# Patient Record
Sex: FEMALE | Race: WHITE | NOT HISPANIC OR LATINO | Employment: UNEMPLOYED | URBAN - METROPOLITAN AREA
[De-identification: names, ages, dates, MRNs, and addresses within clinical notes are randomized per-mention and may not be internally consistent; named-entity substitution may affect disease eponyms.]

---

## 2018-07-24 ENCOUNTER — OFFICE VISIT (OUTPATIENT)
Dept: MEDICAL GROUP | Facility: MEDICAL CENTER | Age: 63
End: 2018-07-24
Attending: FAMILY MEDICINE
Payer: MEDICAID

## 2018-07-24 VITALS
WEIGHT: 138.8 LBS | HEIGHT: 67 IN | DIASTOLIC BLOOD PRESSURE: 62 MMHG | HEART RATE: 88 BPM | OXYGEN SATURATION: 96 % | SYSTOLIC BLOOD PRESSURE: 112 MMHG | RESPIRATION RATE: 16 BRPM | TEMPERATURE: 98 F | BODY MASS INDEX: 21.79 KG/M2

## 2018-07-24 DIAGNOSIS — M50.30 DEGENERATIVE DISC DISEASE, CERVICAL: ICD-10-CM

## 2018-07-24 DIAGNOSIS — F41.9 ANXIETY: ICD-10-CM

## 2018-07-24 DIAGNOSIS — Z98.84 STATUS POST GASTRIC BYPASS FOR OBESITY: ICD-10-CM

## 2018-07-24 DIAGNOSIS — E03.9 ACQUIRED HYPOTHYROIDISM: ICD-10-CM

## 2018-07-24 PROCEDURE — 99203 OFFICE O/P NEW LOW 30 MIN: CPT | Performed by: FAMILY MEDICINE

## 2018-07-24 PROCEDURE — 99204 OFFICE O/P NEW MOD 45 MIN: CPT | Performed by: FAMILY MEDICINE

## 2018-07-24 RX ORDER — THYROID 120 MG/1
120 TABLET ORAL DAILY
Qty: 30 TAB | Refills: 3 | Status: SHIPPED | OUTPATIENT
Start: 2018-07-24 | End: 2019-05-23

## 2018-07-24 RX ORDER — DIAZEPAM 5 MG/1
5 TABLET ORAL EVERY 6 HOURS PRN
Qty: 30 TAB | Refills: 1 | Status: SHIPPED | OUTPATIENT
Start: 2018-07-24 | End: 2018-08-23

## 2018-07-24 RX ORDER — OXYCODONE AND ACETAMINOPHEN 10; 325 MG/1; MG/1
1-2 TABLET ORAL EVERY 4 HOURS PRN
COMMUNITY
End: 2019-05-23

## 2018-07-24 RX ORDER — DIAZEPAM 5 MG/1
5 TABLET ORAL EVERY 6 HOURS PRN
COMMUNITY
End: 2018-08-24 | Stop reason: SDUPTHER

## 2018-07-24 ASSESSMENT — PAIN SCALES - GENERAL: PAINLEVEL: NO PAIN

## 2018-07-24 ASSESSMENT — PATIENT HEALTH QUESTIONNAIRE - PHQ9: CLINICAL INTERPRETATION OF PHQ2 SCORE: 0

## 2018-07-24 NOTE — ASSESSMENT & PLAN NOTE
Patient presents to establish care. She was cared for by Dr. Silveira. She is also followed by Dr. Parra for continued right-sided cervical radiculopathy. She reports pain will extend consistently down to right elbow. She had been taking Percocet 10/325 3-4 times daily has been out of that medicine for 6 weeks. There is consideration of doing a additional fusion on C3-4. Her last visit with Dr. Parra was on 4/19/18 at which time she was sent off her course of physical therapy which she reports was not very helpful. She cannot consistently take NSAIDs due to history of previous gastric bypass surgery. She also is taking diazepam phenomenally for anxiety but also for muscle relaxant, 5 mg 3 times a day.

## 2018-07-24 NOTE — ASSESSMENT & PLAN NOTE
Patient was noted to be hypothyroid in approximately 1990. She has not had any thyroid surgery. She has been taking [place] Thyroid 180 µg daily. Recent TSH obtained by her gynecologist in June was notable for suppressed TSH at 0.20 she denies any palpitations, diarrhea

## 2018-07-24 NOTE — ASSESSMENT & PLAN NOTE
Patient reports a long-standing history of anxiety and believe she's been taking Valium 5 mg 3 times a day for about 1-1/2 years. She denies any current suicidal ideation, or confusion. She is not taking any antidepressant medication.

## 2018-07-24 NOTE — PROGRESS NOTES
Chief Complaint   Patient presents with   • Establish Care         HISTORY OF THE PRESENT ILLNESS: Patient is a 63 y.o. female. This pleasant patient is here today to establish care and address the following problems.      Degenerative disc disease, cervical  Patient presents to establish care. She was cared for by Dr. Silveira. She is also followed by Dr. Parra for continued right-sided cervical radiculopathy. She reports pain will extend consistently down to right elbow. She had been taking Percocet 10/325 3-4 times daily has been out of that medicine for 6 weeks. There is consideration of doing a additional fusion on C3-4. Her last visit with Dr. Parra was on 4/19/18 at which time she was sent off her course of physical therapy which she reports was not very helpful. She cannot consistently take NSAIDs due to history of previous gastric bypass surgery. She also is taking diazepam phenomenally for anxiety but also for muscle relaxant, 5 mg 3 times a day.    Anxiety  Patient reports a long-standing history of anxiety and believe she's been taking Valium 5 mg 3 times a day for about 1-1/2 years. She denies any current suicidal ideation, or confusion. She is not taking any antidepressant medication.    Acquired hypothyroidism  Patient was noted to be hypothyroid in approximately 1990. She has not had any thyroid surgery. She has been taking [place] Thyroid 180 µg daily. Recent TSH obtained by her gynecologist in June was notable for suppressed TSH at 0.20 she denies any palpitations, diarrhea     Social history-single, not working  Allergies: Nkda [no known drug allergy]    Current Outpatient Prescriptions Ordered in [facility]   Medication Sig Dispense Refill   • oxyCODONE-acetaminophen (PERCOCET-10)  MG Tab Take 1-2 Tabs by mouth every four hours as needed for Severe Pain.     • thyroid (ARMOUR THYROID) 120 MG Tab Take 1 Tab by mouth every day. 30 Tab 3   • diazePAM (VALIUM) 5 MG Tab Take 1 Tab by mouth every 6 hours as  needed for Anxiety for up to 30 days. 30 Tab 1   • diazePAM (VALIUM) 5 MG Tab Take 5 mg by mouth every 6 hours as needed for Anxiety.     • amoxicillin (AMOXIL) 500 MG CAPS Take 500 mg by mouth 3 times a day.     • Hydrocod Polst-Chlorphen Polst (TUSSIONEX PENNKINETIC ER PO) Take  by mouth.     • THYROID 2 gr. daily       No current Epic-ordered facility-administered medications on file.        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   • Hypothyroid 1990       Past Surgical History:   Procedure Laterality Date   • LAMINOTOMY  2006    ant  C5-7 fusion   • OOPHORECTOMY Left 1999   • ABDOMINAL EXPLORATION      gastric bypass   • OTHER ABDOMINAL SURGERY      left ovary (cyst) removed, D & C  x3   • OTHER ORTHOPEDIC SURGERY      [facility]       Social History   Substance Use Topics   • Smoking status: Never Smoker   • Smokeless tobacco: Never Used   • Alcohol use No       Family Status   Relation Status   • Paternal Grandmother    • Neg Hx      Family History   Problem Relation Age of Onset   • Cancer Paternal Grandmother      breast   • Diabetes Neg Hx    • Heart Disease Neg Hx    • Stroke Neg Hx        Review of Systems   Constitutional: Negative for fever, chills, weight loss and malaise/fatigue.   HENT: Negative for ear pain, nosebleeds, congestion, sore throat and neck pain.    Eyes: Negative for blurred vision.   Respiratory: Negative for cough, sputum production, shortness of breath and wheezing.    Cardiovascular: Negative for chest pain, palpitations, orthopnea and leg swelling.   Gastrointestinal: Negative for heartburn, nausea, vomiting and abdominal pain.   Genitourinary: Negative for dysuria, urgency and frequency.   Musculoskeletal: Negative for myalgias, back pain and positive for right upper arm pain.   Skin: Negative for rash and itching.   Neurological: Negative for dizziness, tingling, tremors, sensory change, focal  "weakness and headaches.   Endo/Heme/Allergies: Does not bruise/bleed easily.   Psychiatric/Behavioral: Negative for depression, positive for anxiety          Exam: Blood pressure 112/62, pulse 88, temperature 36.7 °C (98 °F), resp. rate 16, height 1.702 m (5' 7\"), weight 63 kg (138 lb 12.8 oz), SpO2 96 %, not currently breastfeeding.  General: Normal appearing. No distress.  HEENT: Normocephalic. Eyes conjunctiva clear lids without ptosis, pupils equal and reactive to light accommodation, ears normal shape and contour, canals are clear bilaterally, tympanic membranes are benign, nasal mucosa benign, oropharynx is without erythema, edema or exudates.   Neck: Supple without JVD or bruit. Thyroid is not enlarged.  Pulmonary: Clear to ausculation.  Normal effort. No rales, ronchi, or wheezing.  Cardiovascular: Regular rate and rhythm without murmur. Carotid and radial pulses are intact and equal bilaterally.  Abdomen: Soft, nontender, nondistended. Normal bowel sounds. Liver and spleen are not palpable  Neurologic: Intact light touch and strength bilaterally,normal speech, no tremor, normal gait.   Lymph: No cervical, supraclavicular or axillary lymph nodes are palpable  Skin: Warm and dry.  No obvious lesions.  Musculoskeletal: Normal gait. No extremity cyanosis, clubbing, or edema.  Psych: Normal mood and affect. Alert and oriented x3. Judgment and insight is normal.    Please note that this dictation was created using voice recognition software. I have made every reasonable attempt to correct obvious errors, but I expect that there are errors of grammar and possibly content that I did not discover before finalizing the note.      Assessment/Plan  1. Acquired hypothyroidism  TSH   2. Anxiety  diazePAM (VALIUM) 5 MG Tab   3. Status post gastric bypass for obesity     4. Degenerative disc disease, cervical       Plan: 1. Reduce [place] Thyroid to 120 µg daily  2. Collect TSH (quest) in 4 weeks  3. Revisit in 4 weeks  4. " Due to risk of respiratory depression patient was allowed to choose to remain on diazepam and we will not renew Percocet

## 2018-08-23 ENCOUNTER — TELEPHONE (OUTPATIENT)
Dept: MEDICAL GROUP | Facility: MEDICAL CENTER | Age: 63
End: 2018-08-23

## 2018-08-24 ENCOUNTER — OFFICE VISIT (OUTPATIENT)
Dept: MEDICAL GROUP | Facility: MEDICAL CENTER | Age: 63
End: 2018-08-24
Attending: FAMILY MEDICINE
Payer: MEDICAID

## 2018-08-24 VITALS
TEMPERATURE: 98 F | BODY MASS INDEX: 22.6 KG/M2 | RESPIRATION RATE: 16 BRPM | HEIGHT: 67 IN | HEART RATE: 80 BPM | DIASTOLIC BLOOD PRESSURE: 70 MMHG | SYSTOLIC BLOOD PRESSURE: 118 MMHG | OXYGEN SATURATION: 98 % | WEIGHT: 144 LBS

## 2018-08-24 DIAGNOSIS — S39.012A STRAIN, SACRAL, INITIAL ENCOUNTER: ICD-10-CM

## 2018-08-24 DIAGNOSIS — M50.30 DEGENERATIVE DISC DISEASE, CERVICAL: ICD-10-CM

## 2018-08-24 PROCEDURE — 99213 OFFICE O/P EST LOW 20 MIN: CPT | Performed by: FAMILY MEDICINE

## 2018-08-24 RX ORDER — OXYCODONE AND ACETAMINOPHEN 10; 325 MG/1; MG/1
1 TABLET ORAL EVERY 6 HOURS PRN
Qty: 15 TAB | Refills: 0 | Status: SHIPPED | OUTPATIENT
Start: 2018-08-24 | End: 2018-09-21

## 2018-08-24 RX ORDER — DIAZEPAM 5 MG/1
5 TABLET ORAL
Qty: 30 TAB | Refills: 1 | Status: SHIPPED | OUTPATIENT
Start: 2018-08-24 | End: 2018-09-17 | Stop reason: SDUPTHER

## 2018-08-24 ASSESSMENT — PAIN SCALES - GENERAL: PAINLEVEL: 3=SLIGHT PAIN

## 2018-08-24 NOTE — TELEPHONE ENCOUNTER
----- Message from Star Pina M.D. sent at 8/23/2018  9:35 AM PDT -----  Current thyroid is normal at TSH of 2.28 suggesting current dose of thyroid hormone at 120 µg is a good fit.

## 2018-08-24 NOTE — TELEPHONE ENCOUNTER
Phone Number Called: [phone] (home)       Message: Current thyroid is normal at TSH of 2.28 suggesting current dose of thyroid hormone at 120 µg is a good fit.     Left Message for patient to call back: yes

## 2018-08-24 NOTE — PROGRESS NOTES
"Subjective:      Shilpa Dobbs is a 63 y.o. female who presents with Hypothyroidism            HPI 1. Degenerative cervical disc disease-patient reports that she has done exception well taking a single 5 mg dose of diazepam daily with regard to muscle spasm and anxiety. She actually feels better than when she used to take 3 doses per day. Denies any recent panic episodes.  2.  Sacral strain-patient notes left-sided low back soreness over the past 3 days.  Denies any dysuria, altered gait    ROS negative for dyspnea, confusion, numbness       Objective:     /70   Pulse 80   Temp 36.7 °C (98 °F)   Resp 16   Ht 1.702 m (5' 7.01\")   Wt 65.3 kg (144 lb)   SpO2 98%   BMI 22.55 kg/m²      Physical Exam  Gen.- alert, cooperative, in no acute distress  Neck- midline trachea, thyroid not enlarged or tender,supple, no cervical adenopathy  Chest-clear to auscultation and percussion with normal breath sounds. No retractions. Chest wall nontender  Cardiac- regular rhythm and rate. No murmur, thrill, or heave  Back-mildly tender over the left SI area to palpation without redness or swelling.  Remainder of the bony midline elements are nontender          Assessment/Plan:     1. Degenerative disc disease, cervical    - diazePAM (VALIUM) 5 MG Tab; Take 1 Tab by mouth 1 time daily as needed for Anxiety for up to 30 days.  Dispense: 30 Tab; Refill: 1  - oxyCODONE-acetaminophen (PERCOCET-10)  MG Tab; Take 1 Tab by mouth every 6 hours as needed for Severe Pain for up to 28 days.  Dispense: 15 Tab; Refill: 0  - CONSENT FOR OPIATE PRESCRIPTION    2. Strain, sacral, initial encounter    Plan: 1.  Renew diazepam 5 mg once daily  2.  Revisit with me within 2 months      "

## 2018-09-17 DIAGNOSIS — M50.30 DEGENERATIVE DISC DISEASE, CERVICAL: ICD-10-CM

## 2018-09-17 RX ORDER — DIAZEPAM 5 MG/1
5 TABLET ORAL
Qty: 30 TAB | Refills: 0 | Status: SHIPPED | OUTPATIENT
Start: 2018-09-17 | End: 2018-10-17

## 2019-05-23 ENCOUNTER — APPOINTMENT (OUTPATIENT)
Dept: RADIOLOGY | Facility: MEDICAL CENTER | Age: 64
End: 2019-05-23
Attending: EMERGENCY MEDICINE
Payer: MEDICAID

## 2019-05-23 ENCOUNTER — HOSPITAL ENCOUNTER (OUTPATIENT)
Facility: MEDICAL CENTER | Age: 64
End: 2019-05-26
Attending: EMERGENCY MEDICINE | Admitting: HOSPITALIST
Payer: MEDICAID

## 2019-05-23 DIAGNOSIS — R20.2 PARESTHESIAS: ICD-10-CM

## 2019-05-23 LAB
ALBUMIN SERPL BCP-MCNC: 4.8 G/DL (ref 3.2–4.9)
ALBUMIN/GLOB SERPL: 2 G/DL
ALP SERPL-CCNC: 123 U/L (ref 30–99)
ALT SERPL-CCNC: 23 U/L (ref 2–50)
ANION GAP SERPL CALC-SCNC: 11 MMOL/L (ref 0–11.9)
APPEARANCE UR: CLEAR
AST SERPL-CCNC: 26 U/L (ref 12–45)
BASOPHILS # BLD AUTO: 1.1 % (ref 0–1.8)
BASOPHILS # BLD: 0.08 K/UL (ref 0–0.12)
BILIRUB SERPL-MCNC: 0.2 MG/DL (ref 0.1–1.5)
BILIRUB UR QL STRIP.AUTO: NEGATIVE
BUN SERPL-MCNC: 22 MG/DL (ref 8–22)
CALCIUM SERPL-MCNC: 9.1 MG/DL (ref 8.5–10.5)
CHLORIDE SERPL-SCNC: 106 MMOL/L (ref 96–112)
CO2 SERPL-SCNC: 22 MMOL/L (ref 20–33)
COLOR UR: YELLOW
CREAT SERPL-MCNC: 0.74 MG/DL (ref 0.5–1.4)
CRP SERPL HS-MCNC: 0.02 MG/DL (ref 0–0.75)
EOSINOPHIL # BLD AUTO: 0.21 K/UL (ref 0–0.51)
EOSINOPHIL NFR BLD: 2.9 % (ref 0–6.9)
ERYTHROCYTE [DISTWIDTH] IN BLOOD BY AUTOMATED COUNT: 49.8 FL (ref 35.9–50)
ERYTHROCYTE [SEDIMENTATION RATE] IN BLOOD BY WESTERGREN METHOD: 6 MM/HOUR (ref 0–30)
GLOBULIN SER CALC-MCNC: 2.4 G/DL (ref 1.9–3.5)
GLUCOSE SERPL-MCNC: 109 MG/DL (ref 65–99)
GLUCOSE UR STRIP.AUTO-MCNC: NEGATIVE MG/DL
HCT VFR BLD AUTO: 46 % (ref 37–47)
HGB BLD-MCNC: 14.6 G/DL (ref 12–16)
IMM GRANULOCYTES # BLD AUTO: 0.02 K/UL (ref 0–0.11)
IMM GRANULOCYTES NFR BLD AUTO: 0.3 % (ref 0–0.9)
KETONES UR STRIP.AUTO-MCNC: ABNORMAL MG/DL
LEUKOCYTE ESTERASE UR QL STRIP.AUTO: NEGATIVE
LYMPHOCYTES # BLD AUTO: 2.47 K/UL (ref 1–4.8)
LYMPHOCYTES NFR BLD: 34.3 % (ref 22–41)
MCH RBC QN AUTO: 28.8 PG (ref 27–33)
MCHC RBC AUTO-ENTMCNC: 31.7 G/DL (ref 33.6–35)
MCV RBC AUTO: 90.7 FL (ref 81.4–97.8)
MICRO URNS: ABNORMAL
MONOCYTES # BLD AUTO: 0.57 K/UL (ref 0–0.85)
MONOCYTES NFR BLD AUTO: 7.9 % (ref 0–13.4)
NEUTROPHILS # BLD AUTO: 3.86 K/UL (ref 2–7.15)
NEUTROPHILS NFR BLD: 53.5 % (ref 44–72)
NITRITE UR QL STRIP.AUTO: NEGATIVE
NRBC # BLD AUTO: 0 K/UL
NRBC BLD-RTO: 0 /100 WBC
PH UR STRIP.AUTO: 5 [PH]
PLATELET # BLD AUTO: 437 K/UL (ref 164–446)
PMV BLD AUTO: 10 FL (ref 9–12.9)
POTASSIUM SERPL-SCNC: 4 MMOL/L (ref 3.6–5.5)
PROT SERPL-MCNC: 7.2 G/DL (ref 6–8.2)
PROT UR QL STRIP: NEGATIVE MG/DL
RBC # BLD AUTO: 5.07 M/UL (ref 4.2–5.4)
RBC UR QL AUTO: NEGATIVE
SODIUM SERPL-SCNC: 139 MMOL/L (ref 135–145)
SP GR UR STRIP.AUTO: 1.03
TROPONIN I SERPL-MCNC: <0.01 NG/ML (ref 0–0.04)
UROBILINOGEN UR STRIP.AUTO-MCNC: 0.2 MG/DL
WBC # BLD AUTO: 7.2 K/UL (ref 4.8–10.8)

## 2019-05-23 PROCEDURE — 81003 URINALYSIS AUTO W/O SCOPE: CPT

## 2019-05-23 PROCEDURE — 80053 COMPREHEN METABOLIC PANEL: CPT

## 2019-05-23 PROCEDURE — 99285 EMERGENCY DEPT VISIT HI MDM: CPT

## 2019-05-23 PROCEDURE — 85652 RBC SED RATE AUTOMATED: CPT

## 2019-05-23 PROCEDURE — 85025 COMPLETE CBC W/AUTO DIFF WBC: CPT

## 2019-05-23 PROCEDURE — 86140 C-REACTIVE PROTEIN: CPT

## 2019-05-23 PROCEDURE — 71045 X-RAY EXAM CHEST 1 VIEW: CPT

## 2019-05-23 PROCEDURE — 84443 ASSAY THYROID STIM HORMONE: CPT

## 2019-05-23 PROCEDURE — 93005 ELECTROCARDIOGRAM TRACING: CPT | Performed by: EMERGENCY MEDICINE

## 2019-05-23 PROCEDURE — 84484 ASSAY OF TROPONIN QUANT: CPT

## 2019-05-23 RX ORDER — THYROID 120 MG/1
120 TABLET ORAL DAILY
COMMUNITY

## 2019-05-24 ENCOUNTER — APPOINTMENT (OUTPATIENT)
Dept: RADIOLOGY | Facility: MEDICAL CENTER | Age: 64
End: 2019-05-24
Attending: NURSE PRACTITIONER
Payer: MEDICAID

## 2019-05-24 ENCOUNTER — APPOINTMENT (OUTPATIENT)
Dept: RADIOLOGY | Facility: MEDICAL CENTER | Age: 64
End: 2019-05-24
Attending: EMERGENCY MEDICINE
Payer: MEDICAID

## 2019-05-24 PROBLEM — R20.2 NUMBNESS AND TINGLING OF BOTH FEET: Status: ACTIVE | Noted: 2019-05-24

## 2019-05-24 PROBLEM — R20.0 NUMBNESS AND TINGLING OF BOTH FEET: Status: ACTIVE | Noted: 2019-05-24

## 2019-05-24 LAB — TSH SERPL DL<=0.005 MIU/L-ACNC: 0.75 UIU/ML (ref 0.38–5.33)

## 2019-05-24 PROCEDURE — 700102 HCHG RX REV CODE 250 W/ 637 OVERRIDE(OP): Performed by: HOSPITALIST

## 2019-05-24 PROCEDURE — 99220 PR INITIAL OBSERVATION CARE,LEVL III: CPT | Performed by: HOSPITALIST

## 2019-05-24 PROCEDURE — 700117 HCHG RX CONTRAST REV CODE 255: Performed by: EMERGENCY MEDICINE

## 2019-05-24 PROCEDURE — 72158 MRI LUMBAR SPINE W/O & W/DYE: CPT

## 2019-05-24 PROCEDURE — A9270 NON-COVERED ITEM OR SERVICE: HCPCS | Performed by: HOSPITALIST

## 2019-05-24 PROCEDURE — 97161 PT EVAL LOW COMPLEX 20 MIN: CPT

## 2019-05-24 PROCEDURE — A9585 GADOBUTROL INJECTION: HCPCS | Performed by: EMERGENCY MEDICINE

## 2019-05-24 PROCEDURE — 700105 HCHG RX REV CODE 258: Performed by: HOSPITALIST

## 2019-05-24 PROCEDURE — G0378 HOSPITAL OBSERVATION PER HR: HCPCS

## 2019-05-24 RX ORDER — THYROID 120 MG/1
120 TABLET ORAL DAILY
Status: DISCONTINUED | OUTPATIENT
Start: 2019-05-24 | End: 2019-05-26 | Stop reason: HOSPADM

## 2019-05-24 RX ORDER — SODIUM CHLORIDE 9 MG/ML
1000 INJECTION, SOLUTION INTRAVENOUS ONCE
Status: COMPLETED | OUTPATIENT
Start: 2019-05-24 | End: 2019-05-24

## 2019-05-24 RX ORDER — GADOBUTROL 604.72 MG/ML
10 INJECTION INTRAVENOUS ONCE
Status: COMPLETED | OUTPATIENT
Start: 2019-05-24 | End: 2019-05-24

## 2019-05-24 RX ORDER — BISACODYL 10 MG
10 SUPPOSITORY, RECTAL RECTAL
Status: DISCONTINUED | OUTPATIENT
Start: 2019-05-24 | End: 2019-05-26 | Stop reason: HOSPADM

## 2019-05-24 RX ORDER — AMOXICILLIN 250 MG
2 CAPSULE ORAL 2 TIMES DAILY
Status: DISCONTINUED | OUTPATIENT
Start: 2019-05-24 | End: 2019-05-26 | Stop reason: HOSPADM

## 2019-05-24 RX ORDER — POLYETHYLENE GLYCOL 3350 17 G/17G
1 POWDER, FOR SOLUTION ORAL
Status: DISCONTINUED | OUTPATIENT
Start: 2019-05-24 | End: 2019-05-26 | Stop reason: HOSPADM

## 2019-05-24 RX ADMIN — THYROID, PORCINE 120 MG: 120 TABLET ORAL at 06:25

## 2019-05-24 RX ADMIN — GADOBUTROL 10 ML: 604.72 INJECTION INTRAVENOUS at 03:12

## 2019-05-24 RX ADMIN — SENNOSIDES, DOCUSATE SODIUM 2 TABLET: 50; 8.6 TABLET, FILM COATED ORAL at 06:24

## 2019-05-24 RX ADMIN — SODIUM CHLORIDE 1000 ML: 9 INJECTION, SOLUTION INTRAVENOUS at 04:38

## 2019-05-24 RX ADMIN — SENNOSIDES, DOCUSATE SODIUM 2 TABLET: 50; 8.6 TABLET, FILM COATED ORAL at 17:53

## 2019-05-24 ASSESSMENT — ENCOUNTER SYMPTOMS
DIZZINESS: 0
DEPRESSION: 0
MYALGIAS: 0
WEAKNESS: 1
DIARRHEA: 0
FEVER: 0
SHORTNESS OF BREATH: 0
ABDOMINAL PAIN: 0
CHILLS: 0
HEADACHES: 0
SORE THROAT: 0
COUGH: 0
WHEEZING: 0
PALPITATIONS: 0
NAUSEA: 0
FOCAL WEAKNESS: 0
SENSORY CHANGE: 1
VOMITING: 0
TINGLING: 1
PHOTOPHOBIA: 0

## 2019-05-24 ASSESSMENT — GAIT ASSESSMENTS
GAIT LEVEL OF ASSIST: INDEPENDENT
DISTANCE (FEET): 200

## 2019-05-24 ASSESSMENT — PATIENT HEALTH QUESTIONNAIRE - PHQ9
1. LITTLE INTEREST OR PLEASURE IN DOING THINGS: NOT AT ALL
SUM OF ALL RESPONSES TO PHQ9 QUESTIONS 1 AND 2: 0
2. FEELING DOWN, DEPRESSED, IRRITABLE, OR HOPELESS: NOT AT ALL

## 2019-05-24 ASSESSMENT — COGNITIVE AND FUNCTIONAL STATUS - GENERAL
SUGGESTED CMS G CODE MODIFIER MOBILITY: CH
MOBILITY SCORE: 24

## 2019-05-24 ASSESSMENT — LIFESTYLE VARIABLES: ALCOHOL_USE: NO

## 2019-05-24 NOTE — THERAPY
"Physical Therapy Evaluation completed.   Bed Mobility:  Supine to Sit: Independent  Transfers: Sit to Stand: Independent  Gait: Level Of Assist: Independent with No Equipment Needed       Plan of Care: Patient with no further skilled PT needs in the acute care setting at this time  Discharge Recommendations: Equipment: No Equipment Needed.   See \"Rehab Therapy-Acute\" Patient Summary Report for complete documentation.     Pt was able to demonstrate I functional mobility during ambulation, sit<>stand, and transfers. Pt demonstrated with no deficits during neurlogical screening, however, pt continued to report of numbness in balls of feet. Pt was able to complete dynamic balance testing with no marisol LOB. Pt current numbness in feet does not appear to be affecting her mobility at this time. Pt is in no acute skilled PT needs at this time. Anticpate pt to dc home once medically clear.   "

## 2019-05-24 NOTE — THERAPY
Occupational Therapy Contact Note    No OT needs, pt up self with self care and functional mobility. Reports feeling back to baseline more concerned about odd sensation changes in BLE.    Roopa Hernadez, OTR/L

## 2019-05-24 NOTE — ED NOTES
Med Rec Updated and Complete per Pt at bedside  Allergies Reviewed  No PO ABX last 30 days.    Pt denies OTC medication at this time.

## 2019-05-24 NOTE — PROGRESS NOTES
Patient brought up to floor by transport.  Admit profile complete, stroke packet handed to patient, KELLY scale 0, patient on RA. All questions answered at this time.

## 2019-05-24 NOTE — PROGRESS NOTES
MRI cervical and thoracic spine w/o on hold for till 5-25. Pt had contrast MRI lumbar spine w-w/o on 11-24 at 0300 am. 24 hour waiting period required for contrast to clear body.

## 2019-05-24 NOTE — ASSESSMENT & PLAN NOTE
This was acute in onset and patient had no injuries.    Patient has hx of previous cervical surgery   Mri shows moderate to severe central canal stenosis C4-5   Neurosurgery consulted

## 2019-05-24 NOTE — ED NOTES
Pt resting NAD. Breathing even and unlabored. Waiting for admission orders at this time. ERP aware

## 2019-05-24 NOTE — PROGRESS NOTES
Patient admitted after midnight by my colleague.  Patient was seen and evaluated on the floor.  Patient is a 63-year-old female who presented on 5/23/2019, with bilateral feet numbness and global weakness in her arms and legs.  Patient has known medical history of hypothyroidism anxiety choriocarcinoma with mets to the lungs and chemo 28 years ago, previous laminotomy with C5-7 fusion, gastric bypass, and left ovary removal.  Patient states that she had C-spine surgery approximately 10 years ago and was last seen by Dr. Singh a few years ago and told that her hardware was slipping and that she would likely need revision in the future.  Patient has not followed up on this.  On day of admission she presented with sudden onset numbness and tingling at the bottom of both feet and waves of weakness in her arms and legs.  Patient was admitted under observation to CDU for further work-up and monitoring.  MRI lumbar spine was completed which showed L1 superior endplate acute or recent subacute insufficiency compression fracture and L2 superior endplate Schmorl's node with minimal associated reactive marrow edema signal, which could be indicated as a pain generator.  CBC and CMP were essentially benign and noncontributory.  TSH was evaluated due to patient's hypothyroidism and possibility of contributing to neurological symptoms, however was within normal limits.  Urinalysis negative.  Patient's vital signs are stable at this time.  MRI C-spine and T-spine ordered and pending.  Unable to complete MRI at this time as she received contrast less than 24 hours ago.  Will watch patient overnight and obtain MRIs tomorrow to evaluate for possible need for neurosurgery consultation.

## 2019-05-24 NOTE — ED NOTES
Pt provided with toothbrush kit. All needs met at this time. NAD. Updated on delay for bed assignment. Pt verbalizes understanding

## 2019-05-24 NOTE — CARE PLAN
Problem: Communication  Goal: The ability to communicate needs accurately and effectively will improve  Outcome: PROGRESSING AS EXPECTED      Problem: Safety  Goal: Will remain free from injury  Outcome: PROGRESSING AS EXPECTED    Goal: Will remain free from falls  Outcome: PROGRESSING AS EXPECTED      Problem: Knowledge Deficit  Goal: Knowledge of disease process/condition, treatment plan, diagnostic tests, and medications will improve  Outcome: PROGRESSING AS EXPECTED    Goal: Knowledge of the prescribed therapeutic regimen will improve  Outcome: PROGRESSING AS EXPECTED

## 2019-05-24 NOTE — H&P
"Hospital Medicine History & Physical Note    Date of Service  5/24/2019    Primary Care Physician  Randa Hyman M.D.    Consultants  None    Code Status  Full    Chief Complaint  Chief Complaint   Patient presents with   • Weakness     bilateral legs; sudden onset at 1600   • Numbness     bilateral feet; sudden onset at 1600   • Dizziness     gradual onset t/o day, worsening at 1945       History of Presenting Illness  63 y.o. female who presented on 5/23/2019 with bilateral feet numbness and global weakness in her arms and legs.  The patient states that she has a history of cervical spine surgery that was completed 10 years ago with Dr. Juan.  Last year she was seen by Dr. Singh who had informed her that some of her screws were slipping and that she would likely need a revision of her cervical spine surgery.  She has not yet followed up for this.  Today, she had sudden onset of numbness and tingling on the bottom of both of her feet.  She also reports \"waves of weakness\" in her arms and legs.  She denies any incontinence, no saddle anesthesia, and no back pain.  She states that she was seen by her chiropractor who redirected her to the emergency room.  Otherwise prior to this, the patient was in her usual state of health which is good and she has no other somatic complaints.  No neurologic deficits are noted on bedside exam.    Review of Systems  Review of Systems   Constitutional: Negative for chills and fever.   HENT: Negative for congestion and sore throat.    Eyes: Negative for photophobia.   Respiratory: Negative for cough, shortness of breath and wheezing.    Cardiovascular: Negative for chest pain and palpitations.   Gastrointestinal: Negative for abdominal pain, diarrhea, nausea and vomiting.   Genitourinary: Negative for dysuria.   Musculoskeletal: Negative for myalgias.   Skin: Negative.    Neurological: Positive for tingling (feet), sensory change (bottom of both feet) and weakness (global). " Negative for dizziness, focal weakness and headaches.   Psychiatric/Behavioral: Negative for depression and suicidal ideas.       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       Surgical History  Past Surgical History:   Procedure Laterality Date   • LAMINOTOMY      ant  C5-7 fusion   • OOPHORECTOMY Left    • ABDOMINAL EXPLORATION      gastric bypass   • OTHER ABDOMINAL SURGERY      left ovary (cyst) removed, D & C  x3   • OTHER ORTHOPEDIC SURGERY      ACF       Family History  Family History   Problem Relation Age of Onset   • Cancer Paternal Grandmother         breast   • Diabetes Neg Hx    • Heart Disease Neg Hx    • Stroke Neg Hx        Social History  Social History   Substance Use Topics   • Smoking status: Never Smoker   • Smokeless tobacco: Never Used   • Alcohol use No       Allergies  Allergies   Allergen Reactions   • Nkda [No Known Drug Allergy]        Medications  No current facility-administered medications on file prior to encounter.      No current outpatient prescriptions on file prior to encounter.       Physical Exam  Hemodynamics  Temp (24hrs), Av.3 °C (97.3 °F), Min:36.3 °C (97.3 °F), Max:36.3 °C (97.3 °F)   Temperature: 36.3 °C (97.3 °F)  Pulse  Av.6  Min: 72  Max: 92 Heart Rate (Monitored): 76  Blood Pressure: 113/63, NIBP: (!) 97/51      Respiratory      Respiration: 16, Pulse Oximetry: 95 %             Physical Exam   Constitutional: She is oriented to person, place, and time. No distress.   HENT:   Head: Normocephalic and atraumatic.   Right Ear: External ear normal.   Left Ear: External ear normal.   Eyes: EOM are normal. Right eye exhibits no discharge. Left eye exhibits no discharge.   Neck: Neck supple. No JVD present.   Cardiovascular: Normal rate, regular rhythm and normal heart sounds.    Pulmonary/Chest: Effort normal and breath sounds  normal. No respiratory distress. She exhibits no tenderness.   Abdominal: Soft. Bowel sounds are normal. She exhibits no distension. There is no tenderness.   Musculoskeletal: Normal range of motion. She exhibits no edema.   Neurological: She is alert and oriented to person, place, and time. No cranial nerve deficit.   Skin: Skin is warm and dry. She is not diaphoretic. No erythema.   Psychiatric: She has a normal mood and affect. Her behavior is normal.   Nursing note and vitals reviewed.    Capillary refill less than 3 seconds, distal pulses intact    Laboratory:  Recent Labs      05/23/19 2035   WBC  7.2   RBC  5.07   HEMOGLOBIN  14.6   HEMATOCRIT  46.0   MCV  90.7   MCH  28.8   MCHC  31.7*   RDW  49.8   PLATELETCT  437   MPV  10.0     Recent Labs      05/23/19 2035   SODIUM  139   POTASSIUM  4.0   CHLORIDE  106   CO2  22   GLUCOSE  109*   BUN  22   CREATININE  0.74   CALCIUM  9.1     Recent Labs      05/23/19 2035   ALTSGPT  23   ASTSGOT  26   ALKPHOSPHAT  123*   TBILIRUBIN  0.2   GLUCOSE  109*                 Lab Results   Component Value Date    TROPONINI <0.01 05/23/2019       Imaging  Dx-chest-portable (1 View)    Result Date: 5/23/2019 5/23/2019 8:43 PM HISTORY/REASON FOR EXAM:  Chest Pain. TECHNIQUE/EXAM DESCRIPTION AND NUMBER OF VIEWS: Single portable view of the chest. COMPARISON: March 28, 2013 FINDINGS: Heart size is within normal limits. No pulmonary infiltrates or consolidations are noted. No pleural abnormalities are noted.     No acute cardiac or pulmonary abnormalities are identified.        Assessment/Plan:  Anticipate that patient will need less than 2 midnights for management of the discussed medical issues.    * Numbness and tingling of both feet   Assessment & Plan    This was acute in onset and patient had no injuries.  She otherwise has no other neurologic deficit.  She does carry a history of hypothyroidism and I will check a TSH with T4 as undercorrected thyroid disease may cause  neurologic issues.  Her inflammatory markers are negative and she has no risk factors for epidural abscess however an MRI has been ordered and is pending read.  In the meantime, the patient will be admitted to the hospital and will have her assessed by PT and OT for her weakness.  Pending the findings on the MRI, we may also consider a neurology consultation.  Patient is otherwise well-appearing with normal vital signs and laboratory findings.     Acquired hypothyroidism- (present on admission)   Assessment & Plan    This is chronic and stable, continue home [place] Thyroid and check TSH.         Prophylaxis: Sequential compression devices for DVT prophylaxis, no PPI indicated, bowel protocol as needed

## 2019-05-24 NOTE — ED TRIAGE NOTES
"Chief Complaint   Patient presents with   • Weakness     bilateral legs; sudden onset at 1600   • Numbness     bilateral feet; sudden onset at 1600   • Dizziness     gradual onset t/o day, worsening at 1945     Patient ambulatory to triage, A&O, anxious - patient ambulatory to triage after bilateral foot numbness, chiropractor recommended ED visit if persist a few hours; patient reports reports Dr. Singh indicated hardware in back is \"slipping and needs to be fixed\" - sx 2009 with dr. Clements (retired).    NIH scale 0, patient A&O, appropriate.  Charge RN made aware.    Patient to R4 via wheelchair.   "

## 2019-05-24 NOTE — ED NOTES
Called lab about CRP result; lab is looking for specimen now. Lab was received however not processed. Waiting for result now

## 2019-05-24 NOTE — PROGRESS NOTES
Pt awake,a&ox4,v/s wnl,no neuro deficits noticed at this time,pt ambulates steady,pt saying she has some tingling sensation on [place] feet,for MRI.

## 2019-05-24 NOTE — ED PROVIDER NOTES
"CHIEF COMPLAINT  Chief Complaint   Patient presents with   • Weakness     bilateral legs; sudden onset at 1600   • Numbness     bilateral feet; sudden onset at 1600   • Dizziness     gradual onset t/o day, worsening at 1945       HPI  Shilpa Dobbs is a 63 y.o. female here for evaluation of weakness, intermittent dizziness, and bilateral numbness to the bottoms of the feet.  Patient states that she has had weakness intermittently over the last few days, and numbness that started earlier this morning.  She denies any fever or chills, chest pain, or shortness of breath.  She denies falling, or trauma, She has no headache, and no back pain.  Patient has no factors that exacerbate or alleviate her symptoms, and she denies any history of the same.  She denies any trauma or recent injuries.  Patient states she does have \"plates and screws\" in her neck, and is very worried that these have moved.    PAST MEDICAL HISTORY   has a past medical history of Anxiety; Cancer (HCC); and Hypothyroid (1990).    SOCIAL HISTORY  Social History     Social History Main Topics   • Smoking status: Never Smoker   • Smokeless tobacco: Never Used   • Alcohol use No   • Drug use: No   • Sexual activity: Not Currently       Family History  No bleeding disorders    SURGICAL HISTORY   has a past surgical history that includes other orthopedic surgery; other abdominal surgery; laminotomy (2006); oophorectomy (Left, 1999); and abdominal exploration.    CURRENT MEDICATIONS  Home Medications     Reviewed by Bhavani Martinez R.N. (Registered Nurse) on 05/23/19 at 2014  Med List Status: Complete   Medication Last Dose Status   thyroid (ARMOUR THYROID) 120 MG Tab 5/23/2019 Active                ALLERGIES  Allergies   Allergen Reactions   • Nkda [No Known Drug Allergy]        REVIEW OF SYSTEMS  See HPI for further details. Review of systems as above, otherwise all other systems are negative.     PHYSICAL EXAM  Constitutional: Well developed, " well nourished.  Mild acute distress.  HEENT: Normocephalic, atraumatic. Posterior pharynx clear and moist.  Eyes:  EOMI. Normal sclera.  Neck: Supple, Full range of motion, nontender.  Chest/Pulmonary: clear to ausculation. Symmetrical expansion.   Cardio: Regular rate and rhythm with no murmur.   Abdomen: Soft, nontender. No peritoneal signs. No guarding. No palpable masses.  Musculoskeletal: No deformity, neurovascular intact.  Sensation changes to the plantar aspect of bilateral feet, not extending to the dorsum, no erythema, no edema.  Neuro: Clear speech, appropriate, cooperative, cranial nerves II-XII grossly intact.  Psych: Anxious, tearful      Results for orders placed or performed during the hospital encounter of 05/23/19   CBC WITH DIFFERENTIAL   Result Value Ref Range    WBC 7.2 4.8 - 10.8 K/uL    RBC 5.07 4.20 - 5.40 M/uL    Hemoglobin 14.6 12.0 - 16.0 g/dL    Hematocrit 46.0 37.0 - 47.0 %    MCV 90.7 81.4 - 97.8 fL    MCH 28.8 27.0 - 33.0 pg    MCHC 31.7 (L) 33.6 - 35.0 g/dL    RDW 49.8 35.9 - 50.0 fL    Platelet Count 437 164 - 446 K/uL    MPV 10.0 9.0 - 12.9 fL    Neutrophils-Polys 53.50 44.00 - 72.00 %    Lymphocytes 34.30 22.00 - 41.00 %    Monocytes 7.90 0.00 - 13.40 %    Eosinophils 2.90 0.00 - 6.90 %    Basophils 1.10 0.00 - 1.80 %    Immature Granulocytes 0.30 0.00 - 0.90 %    Nucleated RBC 0.00 /100 WBC    Neutrophils (Absolute) 3.86 2.00 - 7.15 K/uL    Lymphs (Absolute) 2.47 1.00 - 4.80 K/uL    Monos (Absolute) 0.57 0.00 - 0.85 K/uL    Eos (Absolute) 0.21 0.00 - 0.51 K/uL    Baso (Absolute) 0.08 0.00 - 0.12 K/uL    Immature Granulocytes (abs) 0.02 0.00 - 0.11 K/uL    NRBC (Absolute) 0.00 K/uL   COMP METABOLIC PANEL   Result Value Ref Range    Sodium 139 135 - 145 mmol/L    Potassium 4.0 3.6 - 5.5 mmol/L    Chloride 106 96 - 112 mmol/L    Co2 22 20 - 33 mmol/L    Anion Gap 11.0 0.0 - 11.9    Glucose 109 (H) 65 - 99 mg/dL    Bun 22 8 - 22 mg/dL    Creatinine 0.74 0.50 - 1.40 mg/dL    Calcium  9.1 8.5 - 10.5 mg/dL    AST(SGOT) 26 12 - 45 U/L    ALT(SGPT) 23 2 - 50 U/L    Alkaline Phosphatase 123 (H) 30 - 99 U/L    Total Bilirubin 0.2 0.1 - 1.5 mg/dL    Albumin 4.8 3.2 - 4.9 g/dL    Total Protein 7.2 6.0 - 8.2 g/dL    Globulin 2.4 1.9 - 3.5 g/dL    A-G Ratio 2.0 g/dL   TROPONIN   Result Value Ref Range    Troponin I <0.01 0.00 - 0.04 ng/mL   CRP QUANTITIVE (NON-CARDIAC)   Result Value Ref Range    Stat C-Reactive Protein 0.02 0.00 - 0.75 mg/dL   WESTERGREN SED RATE   Result Value Ref Range    Sed Rate Westergren 6 0 - 30 mm/hour   URINALYSIS,CULTURE IF INDICATED   Result Value Ref Range    Color Yellow     Character Clear     Specific Gravity 1.027 <1.035    Ph 5.0 5.0 - 8.0    Glucose Negative Negative mg/dL    Ketones Trace (A) Negative mg/dL    Protein Negative Negative mg/dL    Bilirubin Negative Negative    Urobilinogen, Urine 0.2 Negative    Nitrite Negative Negative    Leukocyte Esterase Negative Negative    Occult Blood Negative Negative    Micro Urine Req see below    ESTIMATED GFR   Result Value Ref Range    GFR If African American >60 >60 mL/min/1.73 m 2    GFR If Non African American >60 >60 mL/min/1.73 m 2   TSH WITH REFLEX TO FT4   Result Value Ref Range    TSH 0.750 0.380 - 5.330 uIU/mL   EKG   Result Value Ref Range    Report       [facility] Emergency Dept.    Test Date:  2019  Pt Name:    DAMON CRUZ                  Department: ER  MRN:        [id number]                      Room:       RD 04  Gender:     Female                       Technician: [id number]  :        [id number]                   Requested By:GAGAN HOLLIDAY  Order #:    [id number]                    Gabby MD:    Measurements  Intervals                                Axis  Rate:       86                           P:          -43  CT:         168                          QRS:        55  QRSD:       82                           T:          47  QT:         352  QTc:        421    Interpretive  Statements  SINUS RHYTHM  Compared to ECG 04/16/2008 17:01:06  No significant changes       DX-CHEST-PORTABLE (1 VIEW)   Final Result      No acute cardiac or pulmonary abnormalities are identified.      MR-LUMBAR SPINE-WITH & W/O    (Results Pending)       Ekg:  nsr 86.  No st elevation, no st depression, no ectopy, normal intervals.        PROCEDURES     MEDICAL RECORD  I have reviewed patient's medical record and pertinent results are listed above.    COURSE & MEDICAL DECISION MAKING  I have reviewed any medical record information, laboratory studies and radiographic results as noted above.    1:07 AM  The pt will be admitted to [facility] family medicine.  She continues to have foot paresthesias, but no weakness to the lower ext.      2:13 AM  The pt will be admitted to Dr. Trent.  We will order the MRI from here, of the lumbar spine. Dr. Trent will follow.   The pt has no incontinence to bowel/bladder. No urinary retention, no saddle anesthesia.     3:54 AM  I spoke to the radiologist, who states pt has a possible compression fracture of L1, possibly T12.  This could be old as well, in the absence of trauma or falling. No acute cord finding, no epidural abscess noted. Dr. Trent made aware.  Formal read in the am.     FINAL IMPRESSION  Foot paresthesias.       Electronically signed by: William Olmos, 5/23/2019 9:07 PM

## 2019-05-24 NOTE — DISCHARGE PLANNING
Care Transition Team Assessment    Spoke with patient at bedside. Anticipate no needs @ present time. Friend or family will be ride @ D/C.    Information Source  Orientation : Oriented x 4  Information Given By: Patient    Readmission Evaluation  Is this a readmission?: No    Interdisciplinary Discharge Planning  Does Admitting Nurse Feel This Could be a Complex Discharge?: No  Primary Care Physician: Randa Hyman   Lives with - Patient's Self Care Capacity: Alone and Able to Care For Self  Patient or legal guardian wants to designate a caregiver (see row info): No  Support Systems: Children  Housing / Facility: 1 Story Apartment / Condo  Do You Take your Prescribed Medications Regularly: Yes  Able to Return to Previous ADL's: Yes  Mobility Issues: No  Prior Services: Home-Independent  Patient Expects to be Discharged to:: Home  Assistance Needed: No  Durable Medical Equipment: Not Applicable    Discharge Preparedness  What are your discharge supports?: Child  Prior Functional Level: Ambulatory    Functional Assesment  Prior Functional Level: Ambulatory    Finances  Prescription Coverage: Yes    Anticipated Discharge Information  Anticipated discharge disposition: Home  Discharge Address: [address] [place] [address]  Discharge Contact Phone Number: [phone]

## 2019-05-25 ENCOUNTER — APPOINTMENT (OUTPATIENT)
Dept: RADIOLOGY | Facility: MEDICAL CENTER | Age: 64
End: 2019-05-25
Attending: NURSE PRACTITIONER
Payer: MEDICAID

## 2019-05-25 PROBLEM — M48.02 CERVICAL STENOSIS OF SPINAL CANAL: Status: ACTIVE | Noted: 2019-05-25

## 2019-05-25 LAB
ANION GAP SERPL CALC-SCNC: 7 MMOL/L (ref 0–11.9)
BUN SERPL-MCNC: 14 MG/DL (ref 8–22)
CALCIUM SERPL-MCNC: 8.8 MG/DL (ref 8.5–10.5)
CHLORIDE SERPL-SCNC: 107 MMOL/L (ref 96–112)
CO2 SERPL-SCNC: 26 MMOL/L (ref 20–33)
CREAT SERPL-MCNC: 0.65 MG/DL (ref 0.5–1.4)
ERYTHROCYTE [DISTWIDTH] IN BLOOD BY AUTOMATED COUNT: 48 FL (ref 35.9–50)
GLUCOSE SERPL-MCNC: 101 MG/DL (ref 65–99)
HCT VFR BLD AUTO: 38.3 % (ref 37–47)
HGB BLD-MCNC: 12.2 G/DL (ref 12–16)
MCH RBC QN AUTO: 28.4 PG (ref 27–33)
MCHC RBC AUTO-ENTMCNC: 31.9 G/DL (ref 33.6–35)
MCV RBC AUTO: 89.3 FL (ref 81.4–97.8)
PLATELET # BLD AUTO: 336 K/UL (ref 164–446)
PMV BLD AUTO: 9.8 FL (ref 9–12.9)
POTASSIUM SERPL-SCNC: 4 MMOL/L (ref 3.6–5.5)
RBC # BLD AUTO: 4.29 M/UL (ref 4.2–5.4)
SODIUM SERPL-SCNC: 140 MMOL/L (ref 135–145)
WBC # BLD AUTO: 4.5 K/UL (ref 4.8–10.8)

## 2019-05-25 PROCEDURE — G0378 HOSPITAL OBSERVATION PER HR: HCPCS

## 2019-05-25 PROCEDURE — 700102 HCHG RX REV CODE 250 W/ 637 OVERRIDE(OP): Performed by: HOSPITALIST

## 2019-05-25 PROCEDURE — 80048 BASIC METABOLIC PNL TOTAL CA: CPT

## 2019-05-25 PROCEDURE — 36415 COLL VENOUS BLD VENIPUNCTURE: CPT

## 2019-05-25 PROCEDURE — 72141 MRI NECK SPINE W/O DYE: CPT

## 2019-05-25 PROCEDURE — A9270 NON-COVERED ITEM OR SERVICE: HCPCS | Performed by: HOSPITALIST

## 2019-05-25 PROCEDURE — 85027 COMPLETE CBC AUTOMATED: CPT

## 2019-05-25 PROCEDURE — 72146 MRI CHEST SPINE W/O DYE: CPT

## 2019-05-25 PROCEDURE — 99225 PR SUBSEQUENT OBSERVATION CARE,LEVEL II: CPT | Performed by: HOSPITALIST

## 2019-05-25 RX ADMIN — THYROID, PORCINE 120 MG: 120 TABLET ORAL at 05:54

## 2019-05-25 RX ADMIN — SENNOSIDES, DOCUSATE SODIUM 2 TABLET: 50; 8.6 TABLET, FILM COATED ORAL at 19:04

## 2019-05-25 RX ADMIN — SENNOSIDES, DOCUSATE SODIUM 2 TABLET: 50; 8.6 TABLET, FILM COATED ORAL at 05:54

## 2019-05-25 ASSESSMENT — PATIENT HEALTH QUESTIONNAIRE - PHQ9
2. FEELING DOWN, DEPRESSED, IRRITABLE, OR HOPELESS: NOT AT ALL
1. LITTLE INTEREST OR PLEASURE IN DOING THINGS: NOT AT ALL
SUM OF ALL RESPONSES TO PHQ9 QUESTIONS 1 AND 2: 0

## 2019-05-25 ASSESSMENT — ENCOUNTER SYMPTOMS
DEPRESSION: 0
DIZZINESS: 0
HEADACHES: 0
VOMITING: 0
BLURRED VISION: 0
SHORTNESS OF BREATH: 0
COUGH: 0
WEAKNESS: 1
FALLS: 0
SORE THROAT: 0
FEVER: 0
ABDOMINAL PAIN: 0
MYALGIAS: 0
CHILLS: 0
NAUSEA: 0
MEMORY LOSS: 0

## 2019-05-25 NOTE — PROGRESS NOTES
Hospital Medicine Daily Progress Note    Date of Service  5/25/2019    Chief Complaint  63 y.o. female admitted 5/23/2019 with numbness, tingling, and extremity weakness     Hospital Course    Patient is a 63-year-old female who presented on 5/23/2019, with bilateral feet numbness and global weakness in her arms and legs.  Patient has known medical history of hypothyroidism anxiety choriocarcinoma with mets to the lungs and chemo 28 years ago, previous laminotomy with C5-7 fusion, gastric bypass, and left ovary removal.  Patient states that she had C-spine surgery approximately 10 years ago and was last seen by Dr. Singh a few years ago and told that her hardware was slipping and that she would likely need revision in the future.  Patient has not followed up on this.  On day of admission she presented with sudden onset numbness and tingling at the bottom of both feet and waves of weakness in her arms and legs.  Patient was admitted under observation to CDU for further work-up and monitoring.  MRI lumbar spine was completed which showed L1 superior endplate acute or recent subacute insufficiency compression fracture and L2 superior endplate Schmorl's node with minimal associated reactive marrow edema signal, which could be indicated as a pain generator.  CBC and CMP were essentially benign and noncontributory.  TSH was evaluated due to patient's hypothyroidism and possibility of contributing to neurological symptoms, however was within normal limits.  Urinalysis negative      Interval Problem Update  5/25- Patient continues to have intermittent numbness and tingling in bilateral legs, with extremity weakness. MRI Cervical completed which shows moderate to severe central canal stenosis at C4-5. Neurosurgery Dr. Barrios consulted to evaluate patient. Awaiting recommendations.      Consultants/Specialty  Neurosurgery- Dr. Barrios     Code Status  Full     Disposition  TBD based on recommendations per NSG     Review of  Systems  Review of Systems   Constitutional: Negative for chills and fever.   HENT: Negative for congestion and sore throat.    Eyes: Negative for blurred vision.   Respiratory: Negative for cough and shortness of breath.    Cardiovascular: Negative for chest pain and leg swelling.   Gastrointestinal: Negative for abdominal pain, nausea and vomiting.   Genitourinary: Negative for dysuria, frequency and urgency.   Musculoskeletal: Negative for falls and myalgias.   Skin: Negative for rash.   Neurological: Positive for weakness. Negative for dizziness and headaches.   Psychiatric/Behavioral: Negative for depression and memory loss.        Physical Exam  Temp:  [36.4 °C (97.6 °F)-37.1 °C (98.7 °F)] 36.8 °C (98.2 °F)  Pulse:  [69-85] 75  Resp:  [16-18] 18  BP: (102-130)/(52-62) 123/58  SpO2:  [95 %-97 %] 95 %    Physical Exam   Constitutional: She is oriented to person, place, and time. She appears well-developed. She is active and cooperative. No distress.   Pleasant female resting in bed in no acute distress    HENT:   Head: Normocephalic.   Mouth/Throat: Oropharynx is clear and moist.   Eyes: Conjunctivae and lids are normal.   Neck: Neck supple. No tracheal tenderness present.   Cardiovascular: Normal rate and regular rhythm.    Pulmonary/Chest: Effort normal and breath sounds normal. No respiratory distress. She has no decreased breath sounds. She has no wheezes.   Abdominal: Soft. Normal appearance and bowel sounds are normal. She exhibits no distension. There is no tenderness.   Musculoskeletal:   RAMIRES - Equal  strength 5/5  Lower extremity 5/5    Neurological: She is alert and oriented to person, place, and time.   Skin: Skin is warm and dry. She is not diaphoretic.   Psychiatric: She has a normal mood and affect. Her speech is normal and behavior is normal.   Nursing note and vitals reviewed.      Fluids  No intake or output data in the 24 hours ending 05/25/19 1518    Laboratory  Recent Labs      05/23/19    2035 05/25/19   0600   WBC  7.2  4.5*   RBC  5.07  4.29   HEMOGLOBIN  14.6  12.2   HEMATOCRIT  46.0  38.3   MCV  90.7  89.3   MCH  28.8  28.4   MCHC  31.7*  31.9*   RDW  49.8  48.0   PLATELETCT  437  336   MPV  10.0  9.8     Recent Labs      05/23/19 2035 05/25/19   0600   SODIUM  139  140   POTASSIUM  4.0  4.0   CHLORIDE  106  107   CO2  22  26   GLUCOSE  109*  101*   BUN  22  14   CREATININE  0.74  0.65   CALCIUM  9.1  8.8                   Imaging  MR-THORACIC SPINE-W/O   Final Result      1.  There is no significant central canal stenosis or cord abnormality.   2.  There is tiny central disc protrusion at T7-8 without significant spinal or neural foraminal stenosis.   3.  Degenerative changes along the superior endplate of L1.      MR-CERVICAL SPINE-W/O   Final Result      1.  Postsurgical and degenerative changes in the cervical spine as described above.   2.  Moderate to severe central canal stenosis at C4-5.      MR-LUMBAR SPINE-WITH & W/O   Final Result      1.  L4-5 retrolisthesis.   2.  Transitional lumbosacral junction. The most caudad rib-bearing vertebra is designated as T12.   3.  L1 superior endplate acute or recent subacute insufficiency compression fracture.   4.  L2 superior endplate Schmorl's node defect with minimal associated reactive marrow edema signal. This could be a pain generator.   5.  Focal T2 hyperintensity involving the lower sacral segments at S4-S5 suspicious for a sacral insufficiency fracture.   6.  Relatively mild degenerative changes as outlined for each level above in the body of report without central stenosis at any lumbar level. No impingement of the distal cord or conus.   7.  The case was discussed (preliminary call report) by Dr. MONTEMAYOR with GAGAN HOLLIDAY at 3:50 AM 5/24/2019.      DX-CHEST-PORTABLE (1 VIEW)   Final Result      No acute cardiac or pulmonary abnormalities are identified.           Assessment/Plan  * Cervical stenosis of spinal canal   Assessment &  Plan    Neurosurgery to evaluate   MRI cervical shows central canal stenosis moderate to severe at C4-5     Numbness and tingling of both feet   Assessment & Plan    This was acute in onset and patient had no injuries.    Patient has hx of previous cervical surgery   Mri shows moderate to severe central canal stenosis C4-5   Neurosurgery consulted      Acquired hypothyroidism- (present on admission)   Assessment & Plan    This is chronic and stable, continue home Sheldon Thyroid and check TSH.          VTE prophylaxis: SCD, ambulation

## 2019-05-25 NOTE — PROGRESS NOTES
Pt had breakfast,no c/o pain,awaiting for MRI.

## 2019-05-26 ENCOUNTER — PATIENT OUTREACH (OUTPATIENT)
Dept: HEALTH INFORMATION MANAGEMENT | Facility: OTHER | Age: 64
End: 2019-05-26

## 2019-05-26 VITALS
RESPIRATION RATE: 16 BRPM | DIASTOLIC BLOOD PRESSURE: 60 MMHG | WEIGHT: 141.54 LBS | OXYGEN SATURATION: 95 % | SYSTOLIC BLOOD PRESSURE: 131 MMHG | HEIGHT: 67 IN | HEART RATE: 77 BPM | TEMPERATURE: 98 F | BODY MASS INDEX: 22.21 KG/M2

## 2019-05-26 PROCEDURE — 99217 PR OBSERVATION CARE DISCHARGE: CPT | Performed by: HOSPITALIST

## 2019-05-26 PROCEDURE — G0378 HOSPITAL OBSERVATION PER HR: HCPCS

## 2019-05-26 PROCEDURE — 700102 HCHG RX REV CODE 250 W/ 637 OVERRIDE(OP): Performed by: HOSPITALIST

## 2019-05-26 PROCEDURE — A9270 NON-COVERED ITEM OR SERVICE: HCPCS | Performed by: HOSPITALIST

## 2019-05-26 RX ADMIN — SENNOSIDES, DOCUSATE SODIUM 2 TABLET: 50; 8.6 TABLET, FILM COATED ORAL at 05:59

## 2019-05-26 RX ADMIN — THYROID, PORCINE 120 MG: 120 TABLET ORAL at 05:59

## 2019-05-26 ASSESSMENT — PATIENT HEALTH QUESTIONNAIRE - PHQ9
SUM OF ALL RESPONSES TO PHQ9 QUESTIONS 1 AND 2: 0
2. FEELING DOWN, DEPRESSED, IRRITABLE, OR HOPELESS: NOT AT ALL
1. LITTLE INTEREST OR PLEASURE IN DOING THINGS: NOT AT ALL

## 2019-05-26 NOTE — DISCHARGE SUMMARY
Discharge Summary    CHIEF COMPLAINT ON ADMISSION  Chief Complaint   Patient presents with   • Weakness     bilateral legs; sudden onset at 1600   • Numbness     bilateral feet; sudden onset at 1600   • Dizziness     gradual onset t/o day, worsening at 1945       Reason for Admission  Bilat foot numbness     Admission Date  5/23/2019    CODE STATUS  Full Code    HPI & HOSPITAL COURSE      Patient is a 63-year-old female who presented on 5/23/2019, with bilateral feet numbness and global weakness in her arms and legs.  Patient has known medical history of hypothyroidism anxiety choriocarcinoma with mets to the lungs and chemo 28 years ago, previous laminotomy with C5-7 fusion, gastric bypass, and left ovary removal.  Patient states that she had C-spine surgery approximately 10 years ago and was last seen by Dr. Singh a few years ago and told that her hardware was slipping and that she would likely need revision in the future.  Patient has not followed up on this.  On day of admission she presented with sudden onset numbness and tingling at the bottom of both feet and waves of weakness in her arms and legs.  Patient was admitted under observation to CDU for further work-up and monitoring.  MRI lumbar spine was completed which showed L1 superior endplate acute or recent subacute insufficiency compression fracture and L2 superior endplate Schmorl's node with minimal associated reactive marrow edema signal, which could be indicated as a pain generator.  CBC and CMP were essentially benign and noncontributory.  TSH was evaluated due to patient's hypothyroidism and possibility of contributing to neurological symptoms, however was within normal limits.  Urinalysis negative .  MRI cervical spine completed which showed moderate to severe central canal stenosis at C4-5 and neurosurgery was consulted to evaluate.  Neurosurgery consulted and recommend patient follow-up with them as outpatient for further management and possible  future surgical intervention.  Neurosurgery plans to arrange for follow-up with neurology as they feel her tingling and numbness in her feet is likely not related.  Neurosurgery has cleared patient for discharge from hospital.  Patient states no pain at this time just consistent numbness and tingling on the bottoms of her feet.  Patient is up ambulating independently.  Patient's vital signs are stable.  Patient will follow up with PCP, neurosurgery and neurology as outpatient.     Therefore, she is discharged in good and stable condition to home with close outpatient follow-up.        Discharge Date  5/26/2019    FOLLOW UP ITEMS POST DISCHARGE  Please follow up with PCP  Please follow up with Dr. Singh and Neurology     DISCHARGE DIAGNOSES  Principal Problem:    Cervical stenosis of spinal canal POA: Unknown  Active Problems:    Numbness and tingling of both feet POA: Unknown    Acquired hypothyroidism POA: Yes  Resolved Problems:    * No resolved hospital problems. *      FOLLOW UP    Randa Hyman M.D.  [address] [place] [address] [address]  [facility] [address]  [phone]      [facility]  unable to call office to schedule appointment due to weekend.Please call to schedule your appointment for a hospital follow up . Thank you     Tim Singh M.D.  [address]  [facility] [address]  [phone]    Schedule an appointment as soon as possible for a visit in 2 weeks        MEDICATIONS ON DISCHARGE     Medication List      CONTINUE taking these medications      Instructions   thyroid 120 MG Tabs  Commonly known as:  ARMOUR THYROID   Take 120 mg by mouth every day.  Dose:  120 mg            Allergies  Allergies   Allergen Reactions   • Nkda [No Known Drug Allergy]        DIET  Orders Placed This Encounter   Procedures   • Diet Order Regular     Standing Status:   Standing     Number of Occurrences:   1     Order Specific Question:   Diet:     Answer:   Regular [1]       ACTIVITY  As tolerated.  Weight bearing as  tolerated    CONSULTATIONS  Neurosurgery- Dr. Barrios and Dr. Singh     PROCEDURES  None     LABORATORY  Lab Results   Component Value Date    SODIUM 140 05/25/2019    POTASSIUM 4.0 05/25/2019    CHLORIDE 107 05/25/2019    CO2 26 05/25/2019    GLUCOSE 101 (H) 05/25/2019    BUN 14 05/25/2019    CREATININE 0.65 05/25/2019    CREATININE 0.9 04/16/2008        Lab Results   Component Value Date    WBC 4.5 (L) 05/25/2019    HEMOGLOBIN 12.2 05/25/2019    HEMATOCRIT 38.3 05/25/2019    PLATELETCT 336 05/25/2019

## 2019-05-26 NOTE — PROGRESS NOTES
Neurosurgery Progress Note    Subjective:  I saw this patient last 2018 for neck and RT arm pain.  Went to PT and did home traction and improved.  Sudden onset of dysesthesias soles of feet.   Better now but still present.  C, T and L/S spine MRIs reviewed.  C MRI no different than study a year ago.  I see no structural pathology to explain current symptoms.    Exam:  Motor 5/5. FMM done well hands s numbness. 3+ knee jerks.  No clonus.    BP  Min: 107/51  Max: 131/60  Pulse  Av.4  Min: 70  Max: 79  Resp  Av.6  Min: 15  Max: 18  Temp  Av.8 °C (98.3 °F)  Min: 36.7 °C (98 °F)  Max: 37.2 °C (98.9 °F)  SpO2  Av.8 %  Min: 94 %  Max: 98 %    No Data Recorded    Recent Labs      19   0600   WBC  7.2  4.5*   RBC  5.07  4.29   HEMOGLOBIN  14.6  12.2   HEMATOCRIT  46.0  38.3   MCV  90.7  89.3   MCH  28.8  28.4   MCHC  31.7*  31.9*   RDW  49.8  48.0   PLATELETCT  437  336   MPV  10.0  9.8     Recent Labs      19   0600   SODIUM  139  140   POTASSIUM  4.0  4.0   CHLORIDE  106  107   CO2  22  26   GLUCOSE  109*  101*   BUN  22  14   CREATININE  0.74  0.65   CALCIUM  9.1  8.8               Intake/Output     None          No intake or output data in the 24 hours ending 19 1033         • thyroid  120 mg DAILY   • senna-docusate  2 Tab BID    And   • polyethylene glycol/lytes  1 Packet QDAY PRN    And   • magnesium hydroxide  30 mL QDAY PRN    And   • bisacodyl  10 mg QDAY PRN       Assessment and Plan:  OK for DC home.  Pt will call my office and we will arrange outpatient Neurology consultation and f/u with me in 1-2 months.

## 2019-05-26 NOTE — PROGRESS NOTES
A/o,assessment completed,poc discussed,verbalized understanding, dinner tolerated, pt watching tv, denies any pain,sob, n/v,call button within reach,will continue to monitor.

## 2019-05-26 NOTE — CONSULTS
DATE OF SERVICE:  05/25/2019    CHIEF COMPLAINT:  Numbness and tingling in her feet.    HISTORY OF PRESENT ILLNESS:  This is a 63-year-old woman who presented with   bilateral foot numbness.  She apparently also noted a global weakness.  She   was worked up for possible stroke, but I think that her MRI examination of the   brain and CT examination were pretty unremarkable here.  She has known   cervical fusion at, I believe, C5-C6 and C6-C7 done by Dr. Juan in the past   and has cervical degenerative changes above this level, which has been   evaluated by Dr. Singh in the past.  She has also had an MRI of the lumbar   spine showing subacute endplate fracture of L1 and possibly L2.  There is no   severe stenosis here.    Since her hospitalization, the numbness has receded some.  She really is not   complaining of weakness in her legs or arms at this point.    REVIEW OF SYSTEMS:  No chest pain, chest pressure.  No shortness of breath.    No dysuria, fever or chills.    PAST MEDICAL HISTORY:  Remarkable for hypothyroidism, anxiety, and   choriocarcinoma with mets to the lung, treated with chemotherapy 28 years ago.    SURGICAL HISTORY:  In 2006, she had an anterior cervical diskectomy and fusion   at C5-C6 and C6-C7 by Dr. Juan.  She has had oophorectomy, abdominal   exploration, and has had some orthopedic surgery, I believe, on her shoulder.    ALLERGIES:  None known.    MEDICATIONS:  She takes [place] Thyroid.    NEUROLOGIC EXAMINATION:  She is alert and oriented x4.  Cranial nerves II-XII   are intact.  Speech is fluent.  Her motor strength is graded at 5/5 in the   deltoid, biceps, triceps, and the intrinsics.  She is also 5/5 in her   iliopsoas, her quads, her hamstrings, and dorsi and plantar flexors.    IMPRESSION:  She does have some cervical spondylosis on her MRI examination of   the spine, but I do not believe this is causing numbness in her feet or   global weakness.  I can see where she may have  pain, she might have some   radicular symptoms, but it does not appear that she is myelopathic on exam nor   does it appear that she has spinal cord compression to a significant degree   on her MRI.    She does have osteophytic spurring here at C3-C4 and especially C4-C5.  We   will ask Dr. Singh to opine on this as well as he is on call tomorrow.    As far as the numbness in her feet, we did review her MRI examination.  She   does have some degenerative changes in the lumbar spine.  She appears to have   a sacralized lumbar type segment.  At what will be called L4-L5, she has a   degenerative disk with retrolisthesis and significant lateral recess stenosis.    At L3-L4 and L2, she has minimal degenerative changes.  At L1, she has what   appears to be a subacute compression fracture.    I suspect possibly some of the numbness in her legs are related to this L4-L5   degenerative disk and retrolisthesis.  Again, I think she gets some symptoms   related to her neck, which she has talked to Dr. Singh about in the past.    I do not see anything acute surgically that we need to do it and so if she   needs to be discharged and follow up as an outpatient this could be done.    Otherwise, again we will have Dr. Singh see her tomorrow for followup.       ____________________________________     MD CASH VILLAVICENCIO / MANNY    DD:  05/25/2019 21:49:49  DT:  05/25/2019 22:33:43    D#:  [id number]  Job#:  [id number]

## 2019-05-26 NOTE — PROGRESS NOTES
Faxed medical record release per pt request. Unable to print out confirmation for pt. Fax machine stated the fax was completed. This tech went to verify with Neeru GUERRERO that pt was okay to leave. This tech grabbed a wheelchair and went to pt room to take them off the floor. Pt not in room. Neeru GUERRERO received phone call stating that pt had gone the wrong way and was at the trauma elevators. Neeru GUERRERO verified pt okay to leave.

## 2019-05-26 NOTE — DISCHARGE INSTRUCTIONS
Discharge Instructions    Discharged to home by car with self. Discharged via wheelchair, hospital escort: Yes.  Special equipment needed: Not Applicable    Be sure to schedule a follow-up appointment with your primary care doctor or any specialists as instructed.     Discharge Plan:   Diet Plan: Discussed  Activity Level: Discussed  Confirmed Follow up Appointment: Patient to Call and Schedule Appointment  Confirmed Symptoms Management: Discussed  Medication Reconciliation Updated: Yes  Influenza Vaccine Indication: Patient Refuses    I understand that a diet low in cholesterol, fat, and sodium is recommended for good health. Unless I have been given specific instructions below for another diet, I accept this instruction as my diet prescription.   Other diet: Heart Healthy     Special Instructions:     Please follow up with PCP to discuss continued thyroid monitoring   Follow up with Dr. Singh and Neurology    · Is patient discharged on Warfarin / Coumadin?   No     Depression / Suicide Risk    As you are discharged from this [facility] facility, it is important to learn how to keep safe from harming yourself.    Recognize the warning signs:  · Abrupt changes in personality, positive or negative- including increase in energy   · Giving away possessions  · Change in eating patterns- significant weight changes-  positive or negative  · Change in sleeping patterns- unable to sleep or sleeping all the time   · Unwillingness or inability to communicate  · Depression  · Unusual sadness, discouragement and loneliness  · Talk of wanting to die  · Neglect of personal appearance   · Rebelliousness- reckless behavior  · Withdrawal from people/activities they love  · Confusion- inability to concentrate     If you or a loved one observes any of these behaviors or has concerns about self-harm, here's what you can do:  · Talk about it- your feelings and reasons for harming yourself  · Remove any means that you might use to  hurt yourself (examples: pills, rope, extension cords, firearm)  · Get professional help from the community (Mental Health, Substance Abuse, psychological counseling)  · Do not be alone:Call your Safe Contact- someone whom you trust who will be there for you.  · Call your local CRISIS HOTLINE 686-9253 or 012-874-1253  · Call your local Children's Mobile Crisis Response Team [place] (683) 221-4913 or www.[place]  · Call the toll free National Suicide Prevention Hotlines   · National Suicide Prevention Lifeline 464-937-ESXD (6831)  · National Hope Line Network 800-SUICIDE (485-3394)

## 2019-06-27 ENCOUNTER — HOSPITAL ENCOUNTER (OUTPATIENT)
Dept: RADIOLOGY | Facility: MEDICAL CENTER | Age: 64
End: 2019-06-27
Attending: STUDENT IN AN ORGANIZED HEALTH CARE EDUCATION/TRAINING PROGRAM
Payer: MEDICAID

## 2019-06-27 DIAGNOSIS — G89.29 CHRONIC NONINTRACTABLE HEADACHE, UNSPECIFIED HEADACHE TYPE: ICD-10-CM

## 2019-06-27 DIAGNOSIS — R51.9 CHRONIC NONINTRACTABLE HEADACHE, UNSPECIFIED HEADACHE TYPE: ICD-10-CM

## 2019-09-19 LAB — EKG IMPRESSION: NORMAL

## 2021-03-15 DIAGNOSIS — Z23 NEED FOR VACCINATION: ICD-10-CM

## 2021-06-18 NOTE — PROGRESS NOTES
A/o,assessment completed,poc discussed,verbalized understanding, tolerating po well, denies pain, n/v, dizziness, sob,still has some numbness rudi feet, sole of her feet, ambulatory, call button within reach,will continue to monitor.   Valtrex Pregnancy And Lactation Text: this medication is Pregnancy Category B and is considered safe during pregnancy. This medication is not directly found in breast milk but it's metabolite acyclovir is present.

## 2023-07-10 ENCOUNTER — NON-PROVIDER VISIT (OUTPATIENT)
Dept: OCCUPATIONAL MEDICINE | Facility: CLINIC | Age: 68
End: 2023-07-10

## 2023-07-10 DIAGNOSIS — Z11.1 ENCOUNTER FOR PPD TEST: Primary | ICD-10-CM

## 2023-07-10 PROCEDURE — 86580 TB INTRADERMAL TEST: CPT | Performed by: NURSE PRACTITIONER

## 2023-07-13 ENCOUNTER — NON-PROVIDER VISIT (OUTPATIENT)
Dept: OCCUPATIONAL MEDICINE | Facility: CLINIC | Age: 68
End: 2023-07-13

## 2023-07-13 LAB — TB WHEAL 3D P 5 TU DIAM: NORMAL MM

## 2023-07-17 ENCOUNTER — NON-PROVIDER VISIT (OUTPATIENT)
Dept: OCCUPATIONAL MEDICINE | Facility: CLINIC | Age: 68
End: 2023-07-17

## 2023-07-17 DIAGNOSIS — Z11.1 ENCOUNTER FOR PPD TEST: Primary | ICD-10-CM

## 2023-07-17 PROCEDURE — 86580 TB INTRADERMAL TEST: CPT | Performed by: NURSE PRACTITIONER

## 2023-07-17 NOTE — PROGRESS NOTES
PPD Placed. Explained that she has a 48 to 72 window to come back and get it read. Patient stated it was understood and took a copy of the tb instructions.

## 2023-07-20 ENCOUNTER — NON-PROVIDER VISIT (OUTPATIENT)
Dept: OCCUPATIONAL MEDICINE | Facility: CLINIC | Age: 68
End: 2023-07-20

## 2023-07-20 DIAGNOSIS — Z11.1 ENCOUNTER FOR PPD SKIN TEST READING: ICD-10-CM

## 2023-07-20 LAB — TB WHEAL 3D P 5 TU DIAM: NORMAL MM
